# Patient Record
Sex: FEMALE | URBAN - METROPOLITAN AREA
[De-identification: names, ages, dates, MRNs, and addresses within clinical notes are randomized per-mention and may not be internally consistent; named-entity substitution may affect disease eponyms.]

---

## 2022-07-25 ENCOUNTER — OFFICE VISIT (OUTPATIENT)
Dept: URBAN - METROPOLITAN AREA CLINIC 17 | Facility: CLINIC | Age: 27
End: 2022-07-25
Payer: COMMERCIAL

## 2022-07-25 DIAGNOSIS — H40.033 ANATOMICAL NARROW ANGLE, BILATERAL: Primary | ICD-10-CM

## 2022-07-25 PROCEDURE — 99204 OFFICE O/P NEW MOD 45 MIN: CPT | Performed by: OPHTHALMOLOGY

## 2022-07-25 PROCEDURE — 92020 GONIOSCOPY: CPT | Performed by: OPHTHALMOLOGY

## 2022-07-25 PROCEDURE — 76514 ECHO EXAM OF EYE THICKNESS: CPT | Performed by: OPHTHALMOLOGY

## 2022-07-25 PROCEDURE — 92133 CPTRZD OPH DX IMG PST SGM ON: CPT | Performed by: OPHTHALMOLOGY

## 2022-07-25 RX ORDER — PREDNISOLONE ACETATE 10 MG/ML
1 % SUSPENSION/ DROPS OPHTHALMIC
Qty: 5 | Refills: 0 | Status: ACTIVE
Start: 2022-07-25

## 2022-07-25 ASSESSMENT — INTRAOCULAR PRESSURE
OD: 21
OS: 21

## 2022-07-25 NOTE — IMPRESSION/PLAN
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 Condition: unstable. IOP borderline OU 
average CCTs, no adjustment needed. Plan: Discussed diagnosis, at risk for AACG, IOP/ONH/Glaucoma management and risks, explained and understood. OCT ordered and reviewed today. Recommend LPI OU to prevent a sudden attack of glaucoma. Advised patient to avoid certain medications that might dilate the pupils until LPI is done. Discussed RBA's and laser procedure. Patient elects LPI OU. RL=2.  Start prednisolone qid x 7 days after laser

## 2022-08-08 ENCOUNTER — SURGERY (OUTPATIENT)
Dept: URBAN - METROPOLITAN AREA SURGERY 7 | Facility: SURGERY | Age: 27
End: 2022-08-08
Payer: COMMERCIAL

## 2022-08-15 ENCOUNTER — POST-OPERATIVE VISIT (OUTPATIENT)
Dept: URBAN - METROPOLITAN AREA CLINIC 17 | Facility: CLINIC | Age: 27
End: 2022-08-15
Payer: COMMERCIAL

## 2022-08-15 DIAGNOSIS — Z48.810 ENCOUNTER FOR SURGICAL AFTERCARE FOLLOWING SURGERY ON A SENSE ORGAN: Primary | ICD-10-CM

## 2022-08-15 PROCEDURE — 99024 POSTOP FOLLOW-UP VISIT: CPT | Performed by: OPTOMETRIST

## 2022-08-15 ASSESSMENT — INTRAOCULAR PRESSURE
OS: 11
OD: 9

## 2022-08-15 NOTE — IMPRESSION/PLAN
Impression: S/P LPI (Laser Peripheral Iridotomy) OU - 7 Days. Encounter for surgical aftercare following surgery on a sense organ  Z48.810.  Plan: Return to optometrist for continued care --d/c using pred ace